# Patient Record
(demographics unavailable — no encounter records)

---

## 2025-01-14 NOTE — ASSESSMENT
[FreeTextEntry1] : 72 years old male with regional node positive locally advanced prostate cancer without evidence of distant metastasis. Patient received bicalutamide for one month and received Eligard 45mg in October 2022. Patient has been enrolled in study NRG--009, Decipher score 0.65, randomized to de-intensification, Arm 1 RT + 24 mos ADT.  - s/p RT with Dr. Araya, 28 frax started on 1/13/2023 and completed on 2/23/2023. - continue follow up with Research team and urology; Decipher score 0.65, patient randomized to de-intensification cohort of NRG--009 Arm 1: RT + 24 mos ADT (ADT started on 10/27/22), last ADT 7/26 - Eligard 45mg given w/ Dr. Rodriguez - f/w urology Dr. Diego; oxybutynin stopped, currently on flomax for urinary dribbling after RT - reviewed labs July 2024: testosterone <2.5, PSA < 0.01 - f/u with PCP for adjusting diet based on HbA1C (HbA1C 6.3 on 1/20/2023). - DEXA scan 10/2022 shows normal bone density; continue calcium and vitamin D - continue with xanax 0.25mg q12h PRN for anxiety - right finger stiffness likely arthritis, continue to monitor - mild anemia/thrombocytopenia: send lab work evaluation  RTC 5-6 months in january

## 2025-01-14 NOTE — HISTORY OF PRESENT ILLNESS
[Disease: _____________________] : Disease: [unfilled] [T: ___] : T[unfilled] [N: ___] : N[unfilled] [M: ___] : M[unfilled] [AJCC Stage: ____] : AJCC Stage: [unfilled] [de-identified] : Klever Mitchell is a 72 years old male with locally advanced prostate cancer who present for following up.   He had the initial consultation on 10/17/22:  initially elevated PSA of 5 in Oct 2020 with "indeterminate" biopsy in [facility].   Patient has been enrolled in study NRG--009, Decipher score 0.65, randomized to de-intensification, Arm 1 RT + 24 mos ADT. Patient was re-consented as there had been changes to protocol, none of which affect his current treatment. Some of the changes in amendment 2 include: Relugolix was added as an acceptable ADT. The imaging sub study that was temporarily closed with protocol amendment 1 has been permanently closed, the applicable sections in the protocol and consent documents have been removed. New exploratory end point and corresponding information were added about impact of PET use. Pre treatment assessments table - tissue, blood and urine: prior to RT time point was added because it was inadvertently omitted. - Footnote 1: Malignancy is considered 0 for ACE Scoring was added for clarity.  6/14/22 PSA 13.2ng/ml  6/17/22 seen by Dr. Diego. MRI prostate showed one lesion measuring 13 mm in the left posterior medial mid gland to apex peripheral is peripheral zone which did not have any evidence of extraprostatic extension seminal vesicle invasion or pelvic lymphadenopathy.  7/25/22 He underwent biopsy. 3 out of 13 cores were positive including the targeted cord which had [place] 4+5 disease in 60% of the core. 2 other cores were 4+3 and 3+4.  8/26/22 whole body bone scan revealed right posterior rib increased uptake concerning for osseous metastasis. Punctate foci in the right anterolateral mid ribs and possibly left mid posterior rib and nonspecific.  9/16/22 PET CT PSMA revealed a small PSMA positive left obturator lymph node which is compatible with metastatic disease. Intense focus of increased radiotracer activity in the left posterior prostate gland consistent with the MRI identified lesion. Likely traumatic rib foci corresponding to the abnormality seen on the bone scan dated 8 /26/22. An indeterminate focus of mild radiotracer activity at the lateral aspect of his right scapula not well delineated on CT. Metastasis unlikely.   10/13/22 PSA 14.6ng/ml  Reports intermittent left big toe pain 5/10 after injury in 2018 and left hip/groin intermittent pain 7/10, relieved with meloxicam and completed 3 weeks ago. Deep venous doppler negative for DVT. He reports difficulty urination, hesitancy, urgency, nocturia x3 and urinary frequency. Denies, dribbling and gross hematuria. He was started on bicalutamide on Oct 13, 2022.   12/9/22: Patient began treatment with eligard on 10/27 with Dr. Araya. He took bicalutamide x 1 month which is now complete. Patient feels well. He denies hot flashes/sweating, mood swings and fatigue. He has nocturia x3, urgency and sometimes feels like he has incomplete emptying; he denies dysuria and hematuria. He exercises most days, normally walks several miles but now that it is getting colder he exercises in the house. He is constipated, has normal bowel movements 3 times a week. Patient denies fever, chills, headache, vision changes, chest pain, palpitations, shortness of breath, abdominal pain, nausea, vomiting, diarrhea, rash, itching, joint pain.   1/120/23: Overall patient is feeling well. He has started RT and is tolerating. He does feel more fatigued than prior, however it is not having a large impact on his life/quality of life. Patient reports in frequent hot flashes. He notes constipation and has a bowel movement about two times per week. He has anxiety related to his diagnosis that he occasionally takes xanax for, usually only once or twice a week. He has urinary frequency, urinates 4-5x during the day and 4-5x during the night, also experiences urgency and incomplete emptying. He reports sometimes when he starts urinating there is a slight burning. He was just prescribed tamsulosin two days ago.     [de-identified] : prostate adenocarcinoma  [de-identified] :  4/28/23 pt and his daughter report that pt has intermittent dizziness unrelated to body position, which started after radiation therapy (finished in 2/2023). He had two episodes of vertigo in the last month but denies fall and hearing difficulty/tinnitus. Admits hot flushes 2 times/daily lasting 2-5 mins. Denied vision changes, headache, N/V, significant changes with urination and Bowels.    7/14/23 He admits intermittent dizziness at [facility] likely due to hot weather. Last dizziness and hot flushes happened around 2 wks ago at [facility] (returned back to [place] on 7/12/23) Complains sleep 5-6hrs at night and sleep has been interrupted by nocturnal urination 3-4/night;  denied urine frequency, difficulty and dripping. Seen by urology Dr. Diego and prescribed oxybutynin 5mg qhs per note.   10/6/23  He came back from [facility] about 3 days ago, denied n/v, urine frequency, difficulty and dripping. He endorsed intermittent dizziness likely due to hot weather at [facility], nocturnal urination 2-3/night. Reports intermittent hot flushes. occasional finger spasms started about 2 months ago but denied numbness and tingling sensation at finger tips. he had two episodes of thrusting pain at scalp at night in the last week, which resolved spontaneous; denied focal weakness, hearing/vision changes, altered mental status.   1/4/24 Hgb 12.2, PSA 0.25ng/ml, LFT wnl, creatinine 0.98 1/22/24 His dizzinees was resolved. Feels overall fine. Reports normal urination, denies frequency and gross hematuria.  Nocturia 2 to 3.   8/9/24 pt is doing well, denies any complaints. Appetite is good, gaining weight.  Denies any diarrhea, constipation or blood in the stool. Now endorses right metacarpal tenderness during flexion [Phalen's test is Negative].

## 2025-01-14 NOTE — REVIEW OF SYSTEMS
[Dysuria] : no dysuria [Diarrhea: Grade 0] : Diarrhea: Grade 0 [Incontinence] : no incontinence [Negative] : Allergic/Immunologic [FreeTextEntry8] : nocturnal urination 3-4/night which interferes with sleep

## 2025-01-16 NOTE — HISTORY OF PRESENT ILLNESS
[Disease: _____________________] : Disease: [unfilled] [T: ___] : T[unfilled] [N: ___] : N[unfilled] [M: ___] : M[unfilled] [AJCC Stage: ____] : AJCC Stage: [unfilled] [de-identified] : Klever Mitchell is a 72 years old male with locally advanced prostate cancer who present for following up.   He had the initial consultation on 10/17/22:  initially elevated PSA of 5 in Oct 2020 with "indeterminate" biopsy in [facility].   Patient has been enrolled in study NRG--009, Decipher score 0.65, randomized to de-intensification, Arm 1 RT + 24 mos ADT. Patient was re-consented as there had been changes to protocol, none of which affect his current treatment. Some of the changes in amendment 2 include: Relugolix was added as an acceptable ADT. The imaging sub study that was temporarily closed with protocol amendment 1 has been permanently closed, the applicable sections in the protocol and consent documents have been removed. New exploratory end point and corresponding information were added about impact of PET use. Pre treatment assessments table - tissue, blood and urine: prior to RT time point was added because it was inadvertently omitted. - Footnote 1: Malignancy is considered 0 for ACE Scoring was added for clarity.  6/14/22 PSA 13.2ng/ml  6/17/22 seen by Dr. Diego. MRI prostate showed one lesion measuring 13 mm in the left posterior medial mid gland to apex peripheral is peripheral zone which did not have any evidence of extraprostatic extension seminal vesicle invasion or pelvic lymphadenopathy.  7/25/22 He underwent biopsy. 3 out of 13 cores were positive including the targeted cord which had [place] 4+5 disease in 60% of the core. 2 other cores were 4+3 and 3+4.  8/26/22 whole body bone scan revealed right posterior rib increased uptake concerning for osseous metastasis. Punctate foci in the right anterolateral mid ribs and possibly left mid posterior rib and nonspecific.  9/16/22 PET CT PSMA revealed a small PSMA positive left obturator lymph node which is compatible with metastatic disease. Intense focus of increased radiotracer activity in the left posterior prostate gland consistent with the MRI identified lesion. Likely traumatic rib foci corresponding to the abnormality seen on the bone scan dated 8 /26/22. An indeterminate focus of mild radiotracer activity at the lateral aspect of his right scapula not well delineated on CT. Metastasis unlikely.   10/13/22 PSA 14.6ng/ml  Reports intermittent left big toe pain 5/10 after injury in 2018 and left hip/groin intermittent pain 7/10, relieved with meloxicam and completed 3 weeks ago. Deep venous doppler negative for DVT. He reports difficulty urination, hesitancy, urgency, nocturia x3 and urinary frequency. Denies, dribbling and gross hematuria. He was started on bicalutamide on Oct 13, 2022.   12/9/22: Patient began treatment with eligard on 10/27 with Dr. Araya. He took bicalutamide x 1 month which is now complete. Patient feels well. He denies hot flashes/sweating, mood swings and fatigue. He has nocturia x3, urgency and sometimes feels like he has incomplete emptying; he denies dysuria and hematuria. He exercises most days, normally walks several miles but now that it is getting colder he exercises in the house. He is constipated, has normal bowel movements 3 times a week. Patient denies fever, chills, headache, vision changes, chest pain, palpitations, shortness of breath, abdominal pain, nausea, vomiting, diarrhea, rash, itching, joint pain.   1/120/23: Overall patient is feeling well. He has started RT and is tolerating. He does feel more fatigued than prior, however it is not having a large impact on his life/quality of life. Patient reports in frequent hot flashes. He notes constipation and has a bowel movement about two times per week. He has anxiety related to his diagnosis that he occasionally takes xanax for, usually only once or twice a week. He has urinary frequency, urinates 4-5x during the day and 4-5x during the night, also experiences urgency and incomplete emptying. He reports sometimes when he starts urinating there is a slight burning. He was just prescribed tamsulosin two days ago.     [de-identified] : prostate adenocarcinoma  [FreeTextEntry1] : NRG--009--->will stop trial--->ADT [de-identified] :  4/28/23 pt and his daughter report that pt has intermittent dizziness unrelated to body position, which started after radiation therapy (finished in 2/2023). He had two episodes of vertigo in the last month but denies fall and hearing difficulty/tinnitus. Admits hot flushes 2 times/daily lasting 2-5 mins. Denied vision changes, headache, N/V, significant changes with urination and Bowels.    7/14/23 He admits intermittent dizziness at [facility] likely due to hot weather. Last dizziness and hot flushes happened around 2 wks ago at [facility] ([place] back to [place] on 7[place]12/23) Complains sleep 5-6hrs at night and sleep has been interrupted by nocturnal urination 3-4/night;  denied urine frequency, difficulty and dripping. Seen by urology Dr. Diego and prescribed oxybutynin 5mg qhs per note.   10/6/23  He came back from [facility] about 3 days ago, denied n/v, urine frequency, difficulty and dripping. He endorsed intermittent dizziness likely due to hot weather at [facility], nocturnal urination 2-3/night. Reports intermittent hot flushes. occasional finger spasms started about 2 months ago but denied numbness and tingling sensation at finger tips. he had two episodes of thrusting pain at scalp at night in the last week, which resolved spontaneous; denied focal weakness, hearing/vision changes, altered mental status.   1/4/24 Hgb 12.2, PSA 0.25ng/ml, LFT wnl, creatinine 0.98 1/22/24 His dizzinees was resolved. Feels overall fine. Reports normal urination, denies frequency and gross hematuria.  Nocturia 2 to 3.   8/9/24 pt is doing well, denies any complaints. Appetite is good, gaining weight.  Denies any diarrhea, constipation or blood in the stool. Now endorses right metacarpal tenderness during flexion [Phalen's test is Negative].  1/15/25: Patient presents for follow up. He remains on the NRG--009 trial but he was in [facility] and non-compliant with office visits. Last visit was August, 2024 and the protocol requires office visits every 3 months. He experiences dizziness every so often. He endorses frequent urination every hour but sometimes does not urinate. He drinks 7-8 bottles per day.  At night he urinates 4 times a night. Denies night sweats, hot flashes.

## 2025-01-16 NOTE — ASSESSMENT
[FreeTextEntry1] : 72 years old male with regional node positive locally advanced prostate cancer without evidence of distant metastasis. Patient received bicalutamide for one month and received Eligard 45mg in October 2022. Patient has been enrolled in study NRG--009, Decipher score 0.65, randomized to de-intensification, Arm 1 RT + 24 mos ADT. Now, off the trial due to non-compliance. He is on surveillance every 6 months, since it has been 2 years  since he was on ADT with PSA undetectable.  - s/p RT with Dr. Araya, 28 frax started on 1/13/2023 and completed on 2/23/2023. - continue follow up with Research team and urology; Decipher score 0.65, patient randomized to de-intensification cohort of NRG--009 Arm 1: RT + 24 mos ADT (ADT started on 10/27/22), last ADT 7/26 - Eligard 45mg given w/ Dr. Rodriguez, has been 2 years no longer receives this - f/w urology Dr. Diego; oxybutynin stopped, currently on flomax for urinary dribbling after RT - reviewed labs July 2024: testosterone <2.5, PSA < 0.01 - f/u with PCP for adjusting diet based on HbA1C (HbA1C 6.3 on 1/20/2023). - DEXA scan 10/2022 shows normal bone density; continue calcium and vitamin D - continue with xanax 0.25mg q12h PRN for anxiety - mild anemia/thrombocytopenia: send lab work evaluation - on surveillance now every 6 months with PSA  RTC 5-6 months in june  Patient seen and d/w Dr. Mary Cifuentes MD PGY5 Hematology/Oncology Pager: [phone] I have participated in history collection, physical exam and making assessment/plan through the whole clinic visit. I also reviewed and edited the note.

## 2025-01-16 NOTE — HISTORY OF PRESENT ILLNESS
[Disease: _____________________] : Disease: [unfilled] [T: ___] : T[unfilled] [N: ___] : N[unfilled] [M: ___] : M[unfilled] [AJCC Stage: ____] : AJCC Stage: [unfilled] [de-identified] : Klever Mitchell is a 72 years old male with locally advanced prostate cancer who present for following up.   He had the initial consultation on 10/17/22:  initially elevated PSA of 5 in Oct 2020 with "indeterminate" biopsy in [facility].   Patient has been enrolled in study NRG--009, Decipher score 0.65, randomized to de-intensification, Arm 1 RT + 24 mos ADT. Patient was re-consented as there had been changes to protocol, none of which affect his current treatment. Some of the changes in amendment 2 include: Relugolix was added as an acceptable ADT. The imaging sub study that was temporarily closed with protocol amendment 1 has been permanently closed, the applicable sections in the protocol and consent documents have been removed. New exploratory end point and corresponding information were added about impact of PET use. Pre treatment assessments table - tissue, blood and urine: prior to RT time point was added because it was inadvertently omitted. - Footnote 1: Malignancy is considered 0 for ACE Scoring was added for clarity.  6/14/22 PSA 13.2ng/ml  6/17/22 seen by Dr. Diego. MRI prostate showed one lesion measuring 13 mm in the left posterior medial mid gland to apex peripheral is peripheral zone which did not have any evidence of extraprostatic extension seminal vesicle invasion or pelvic lymphadenopathy.  7/25/22 He underwent biopsy. 3 out of 13 cores were positive including the targeted cord which had [place] 4+5 disease in 60% of the core. 2 other cores were 4+3 and 3+4.  8/26/22 whole body bone scan revealed right posterior rib increased uptake concerning for osseous metastasis. Punctate foci in the right anterolateral mid ribs and possibly left mid posterior rib and nonspecific.  9/16/22 PET CT PSMA revealed a small PSMA positive left obturator lymph node which is compatible with metastatic disease. Intense focus of increased radiotracer activity in the left posterior prostate gland consistent with the MRI identified lesion. Likely traumatic rib foci corresponding to the abnormality seen on the bone scan dated 8 /26/22. An indeterminate focus of mild radiotracer activity at the lateral aspect of his right scapula not well delineated on CT. Metastasis unlikely.   10/13/22 PSA 14.6ng/ml  Reports intermittent left big toe pain 5/10 after injury in 2018 and left hip/groin intermittent pain 7/10, relieved with meloxicam and completed 3 weeks ago. Deep venous doppler negative for DVT. He reports difficulty urination, hesitancy, urgency, nocturia x3 and urinary frequency. Denies, dribbling and gross hematuria. He was started on bicalutamide on Oct 13, 2022.   12/9/22: Patient began treatment with eligard on 10/27 with Dr. Araya. He took bicalutamide x 1 month which is now complete. Patient feels well. He denies hot flashes/sweating, mood swings and fatigue. He has nocturia x3, urgency and sometimes feels like he has incomplete emptying; he denies dysuria and hematuria. He exercises most days, normally walks several miles but now that it is getting colder he exercises in the house. He is constipated, has normal bowel movements 3 times a week. Patient denies fever, chills, headache, vision changes, chest pain, palpitations, shortness of breath, abdominal pain, nausea, vomiting, diarrhea, rash, itching, joint pain.   1/120/23: Overall patient is feeling well. He has started RT and is tolerating. He does feel more fatigued than prior, however it is not having a large impact on his life/quality of life. Patient reports in frequent hot flashes. He notes constipation and has a bowel movement about two times per week. He has anxiety related to his diagnosis that he occasionally takes xanax for, usually only once or twice a week. He has urinary frequency, urinates 4-5x during the day and 4-5x during the night, also experiences urgency and incomplete emptying. He reports sometimes when he starts urinating there is a slight burning. He was just prescribed tamsulosin two days ago.     [de-identified] : prostate adenocarcinoma  [FreeTextEntry1] : NRG--009--->will stop trial--->ADT [de-identified] :  4/28/23 pt and his daughter report that pt has intermittent dizziness unrelated to body position, which started after radiation therapy (finished in 2/2023). He had two episodes of vertigo in the last month but denies fall and hearing difficulty/tinnitus. Admits hot flushes 2 times/daily lasting 2-5 mins. Denied vision changes, headache, N/V, significant changes with urination and Bowels.    7/14/23 He admits intermittent dizziness at [facility] likely due to hot weather. Last dizziness and hot flushes happened around 2 wks ago at [facility] (return[place] back to [place] on 7[place]12/23) Complains sleep 5-6hrs at night and sleep has been interrupted by nocturnal urination 3-4/night;  denied urine frequency, difficulty and dripping. Seen by urology Dr. Diego and prescribed oxybutynin 5mg qhs per note.   10/6/23  He came back from [facility] about 3 days ago, denied n/v, urine frequency, difficulty and dripping. He endorsed intermittent dizziness likely due to hot weather at [facility], nocturnal urination 2-3/night. Reports intermittent hot flushes. occasional finger spasms started about 2 months ago but denied numbness and tingling sensation at finger tips. he had two episodes of thrusting pain at scalp at night in the last week, which resolved spontaneous; denied focal weakness, hearing/vision changes, altered mental status.   1/4/24 Hgb 12.2, PSA 0.25ng/ml, LFT wnl, creatinine 0.98 1/22/24 His dizzinees was resolved. Feels overall fine. Reports normal urination, denies frequency and gross hematuria.  Nocturia 2 to 3.   8/9/24 pt is doing well, denies any complaints. Appetite is good, gaining weight.  Denies any diarrhea, constipation or blood in the stool. Now endorses right metacarpal tenderness during flexion [Phalen's test is Negative].  1/15/25: Patient presents for follow up. He remains on the NRG--009 trial but he was in [facility] and non-compliant with office visits. Last visit was August, 2024 and the protocol requires office visits every 3 months. He experiences dizziness every so often. He endorses frequent urination every hour but sometimes does not urinate. He drinks 7-8 bottles per day.  At night he urinates 4 times a night. Denies night sweats, hot flashes.

## 2025-01-16 NOTE — REVIEW OF SYSTEMS
[Diarrhea: Grade 0] : Diarrhea: Grade 0 [Negative] : Allergic/Immunologic [Dysuria] : no dysuria [Incontinence] : no incontinence [FreeTextEntry8] : nocturnal urination 3-4/night which interferes with sleep, urge incontinence

## 2025-01-16 NOTE — HISTORY OF PRESENT ILLNESS
[Disease: _____________________] : Disease: [unfilled] [T: ___] : T[unfilled] [N: ___] : N[unfilled] [M: ___] : M[unfilled] [AJCC Stage: ____] : AJCC Stage: [unfilled] [de-identified] : Klever Mitchell is a 72 years old male with locally advanced prostate cancer who present for following up.   He had the initial consultation on 10/17/22:  initially elevated PSA of 5 in Oct 2020 with "indeterminate" biopsy in [facility].   Patient has been enrolled in study NRG--009, Decipher score 0.65, randomized to de-intensification, Arm 1 RT + 24 mos ADT. Patient was re-consented as there had been changes to protocol, none of which affect his current treatment. Some of the changes in amendment 2 include: Relugolix was added as an acceptable ADT. The imaging sub study that was temporarily closed with protocol amendment 1 has been permanently closed, the applicable sections in the protocol and consent documents have been removed. New exploratory end point and corresponding information were added about impact of PET use. Pre treatment assessments table - tissue, blood and urine: prior to RT time point was added because it was inadvertently omitted. - Footnote 1: Malignancy is considered 0 for ACE Scoring was added for clarity.  6/14/22 PSA 13.2ng/ml  6/17/22 seen by Dr. Diego. MRI prostate showed one lesion measuring 13 mm in the left posterior medial mid gland to apex peripheral is peripheral zone which did not have any evidence of extraprostatic extension seminal vesicle invasion or pelvic lymphadenopathy.  7/25/22 He underwent biopsy. 3 out of 13 cores were positive including the targeted cord which had [place] 4+5 disease in 60% of the core. 2 other cores were 4+3 and 3+4.  8/26/22 whole body bone scan revealed right posterior rib increased uptake concerning for osseous metastasis. Punctate foci in the right anterolateral mid ribs and possibly left mid posterior rib and nonspecific.  9/16/22 PET CT PSMA revealed a small PSMA positive left obturator lymph node which is compatible with metastatic disease. Intense focus of increased radiotracer activity in the left posterior prostate gland consistent with the MRI identified lesion. Likely traumatic rib foci corresponding to the abnormality seen on the bone scan dated 8 /26/22. An indeterminate focus of mild radiotracer activity at the lateral aspect of his right scapula not well delineated on CT. Metastasis unlikely.   10/13/22 PSA 14.6ng/ml  Reports intermittent left big toe pain 5/10 after injury in 2018 and left hip/groin intermittent pain 7/10, relieved with meloxicam and completed 3 weeks ago. Deep venous doppler negative for DVT. He reports difficulty urination, hesitancy, urgency, nocturia x3 and urinary frequency. Denies, dribbling and gross hematuria. He was started on bicalutamide on Oct 13, 2022.   12/9/22: Patient began treatment with eligard on 10/27 with Dr. Araya. He took bicalutamide x 1 month which is now complete. Patient feels well. He denies hot flashes/sweating, mood swings and fatigue. He has nocturia x3, urgency and sometimes feels like he has incomplete emptying; he denies dysuria and hematuria. He exercises most days, normally walks several miles but now that it is getting colder he exercises in the house. He is constipated, has normal bowel movements 3 times a week. Patient denies fever, chills, headache, vision changes, chest pain, palpitations, shortness of breath, abdominal pain, nausea, vomiting, diarrhea, rash, itching, joint pain.   1/120/23: Overall patient is feeling well. He has started RT and is tolerating. He does feel more fatigued than prior, however it is not having a large impact on his life/quality of life. Patient reports in frequent hot flashes. He notes constipation and has a bowel movement about two times per week. He has anxiety related to his diagnosis that he occasionally takes xanax for, usually only once or twice a week. He has urinary frequency, urinates 4-5x during the day and 4-5x during the night, also experiences urgency and incomplete emptying. He reports sometimes when he starts urinating there is a slight burning. He was just prescribed tamsulosin two days ago.     [de-identified] : prostate adenocarcinoma  [FreeTextEntry1] : NRG--009--->will stop trial--->ADT [de-identified] :  4/28/23 pt and his daughter report that pt has intermittent dizziness unrelated to body position, which started after radiation therapy (finished in 2/2023). He had two episodes of vertigo in the last month but denies fall and hearing difficulty/tinnitus. Admits hot flushes 2 times/daily lasting 2-5 mins. Denied vision changes, headache, N/V, significant changes with urination and Bowels.    7/14/23 He admits intermittent dizziness at [facility] likely due to hot weather. Last dizziness and hot flushes happened around 2 wks ago at [facility] ([place] back [place] on 7/12/23) Complains sleep 5-6hrs at night and sleep has been interrupted by nocturnal urination 3-4/night;  denied urine frequency, difficulty and dripping. Seen by urology Dr. Diego and prescribed oxybutynin 5mg qhs per note.   10/6/23  He came back from [facility] about 3 days ago, denied n/v, urine frequency, difficulty and dripping. He endorsed intermittent dizziness likely due to hot weather at [facility], nocturnal urination 2-3/night. Reports intermittent hot flushes. occasional finger spasms started about 2 months ago but denied numbness and tingling sensation at finger tips. he had two episodes of thrusting pain at scalp at night in the last week, which resolved spontaneous; denied focal weakness, hearing/vision changes, altered mental status.   1/4/24 Hgb 12.2, PSA 0.25ng/ml, LFT wnl, creatinine 0.98 1/22/24 His dizzinees was resolved. Feels overall fine. Reports normal urination, denies frequency and gross hematuria.  Nocturia 2 to 3.   8/9/24 pt is doing well, denies any complaints. Appetite is good, gaining weight.  Denies any diarrhea, constipation or blood in the stool. Now endorses right metacarpal tenderness during flexion [Phalen's test is Negative].  1/15/25: Patient presents for follow up. He remains on the NRG--009 trial but he was in [facility] and non-compliant with office visits. Last visit was August, 2024 and the protocol requires office visits every 3 months. He experiences dizziness every so often. He endorses frequent urination every hour but sometimes does not urinate. He drinks 7-8 bottles per day.  At night he urinates 4 times a night. Denies night sweats, hot flashes.

## 2025-01-16 NOTE — ASSESSMENT
[FreeTextEntry1] : 72 years old male with regional node positive locally advanced prostate cancer without evidence of distant metastasis. Patient received bicalutamide for one month and received Eligard 45mg in October 2022. Patient has been enrolled in study NRG--009, Decipher score 0.65, randomized to de-intensification, Arm 1 RT + 24 mos ADT. Now, off the trial due to non-compliance. He is on surveillance every 6 months, since it has been 2 years  since he was on ADT with PSA undetectable.  - s/p RT with Dr. Araya, 28 frax started on 1/13/2023 and completed on 2/23/2023. - continue follow up with Research team and urology; Decipher score 0.65, patient randomized to de-intensification cohort of NRG--009 Arm 1: RT + 24 mos ADT (ADT started on 10/27/22), last ADT 7/26 - Eligard 45mg given w/ Dr. Rodriguez, has been 2 years no longer receives this - f/w urology Dr. Diego; oxybutynin stopped, currently on flomax for urinary dribbling after RT - reviewed labs July 2024: testosterone <2.5, PSA < 0.01 - f/u with PCP for adjusting diet based on HbA1C (HbA1C 6.3 on 1/20/2023). - DEXA scan 10/2022 shows normal bone density; continue calcium and vitamin D - continue with xanax 0.25mg q12h PRN for anxiety - mild anemia/thrombocytopenia: send lab work evaluation - on surveillance now every 6 months with PSA  RTC 5-6 months in june  Patient seen and d/w Dr. Mary Cifuentes MD PGY5 Hematology/Oncology Pager: [phone] I have participated in history collection, physical exam and making assessment/plan through the whole clinic visit. I also reviewed and edited the note.

## 2025-01-16 NOTE — ASSESSMENT
[FreeTextEntry1] : 72 years old male with regional node positive locally advanced prostate cancer without evidence of distant metastasis. Patient received bicalutamide for one month and received Eligard 45mg in October 2022. Patient has been enrolled in study NRG--009, Decipher score 0.65, randomized to de-intensification, Arm 1 RT + 24 mos ADT. Now, off the trial due to non-compliance. He is on surveillance every 6 months, since it has been 2 years  since he was on ADT with PSA undetectable.  - s/p RT with Dr. Araya, 28 frax started on 1/13/2023 and completed on 2/23/2023. - continue follow up with Research team and urology; Decipher score 0.65, patient randomized to de-intensification cohort of NRG--009 Arm 1: RT + 24 mos ADT (ADT started on 10/27/22), last ADT 7/26 - Eligard 45mg given w/ Dr. Rodriguez, has been 2 years no longer receives this - f/w urology Dr. Diego; oxybutynin stopped, currently on flomax for urinary dribbling after RT - reviewed labs July 2024: testosterone <2.5, PSA < 0.01 - f/u with PCP for adjusting diet based on HbA1C (HbA1C 6.3 on 1/20/2023). - DEXA scan 10/2022 shows normal bone density; continue calcium and vitamin D - continue with xanax 0.25mg q12h PRN for anxiety - mild anemia/thrombocytopenia: send lab work evaluation - on surveillance now every 6 months with PSA  RTC 5-6 months in june  Patient seen and d/w Dr. Mary Cifuentes MD PGY5 Hematology/Oncology Pager: [phone] I have participated in history collection, physical exam and making assessment/plan through the whole clinic visit. I also reviewed and edited the note.

## 2025-01-17 NOTE — REVIEW OF SYSTEMS
[Constipation: Grade 0] : Constipation: Grade 0 [Hematuria: Grade 0] : Hematuria: Grade 0 [Urinary Incontinence: Grade 0] : Urinary Incontinence: Grade 0  [Urinary Retention: Grade 1 - Urinary, suprapubic or intermittent catheter placement not indicated; able to void with some residual] : Urinary Retention: Grade 1 - Urinary, suprapubic or intermittent catheter placement not indicated; able to void with some residual [Urinary Tract Pain: Grade 0] : Urinary Tract Pain: Grade 0 [Urinary Urgency: Grade 1 - Present] : Urinary Urgency: Grade 1 - Present [Urinary Frequency: Grade 1 - Present] : Urinary Frequency: Grade 1 - Present

## 2025-01-21 NOTE — DISEASE MANAGEMENT
[c] : c [1] : N1 [0] : M0 [10-20] : 10 - 20 ng/mL [Biopsy with Fusion] : Patient had a biopsy with fusion on [7(4+3)] : Template Biopsy [place] Score: 7(4+3) [9] : Fusion Biopsy Kris Score: 9 [] : Patient had a Prostate MRI [4] : 4 [Clinical] : TNM Stage: c [ANA] : ANA [Radiation Therapy] : Radiation Therapy [Treatment with radiation therapy] : Treatment with radiation therapy [EBRT] : EBRT [Treatment with androgen ablation] : Treatment with androgen ablation [BiopsyDate] : 7/25/22 [MeasuredProstateVolume] : 43 [TotalCores] : 13 [TotalPositiveCores] : 3 [MaxCoreInvolvement] : 60% [FreeTextEntry7] : MR prostate 6/17/22 showed a 4.3 x 3.8 x 5.0 cm = 43 cc gland with dominant lesion in left posteromedial mid to apex peripheral zone (1.3 cm, [place]-4); no DEVIN/SVI/LAD. \par  \par  Biopsy performed 7/25/22 showed G9(4+5) disease in the target lesion (2/4, %); G7(4+3) disease in the left posterior medial apex (1/1, 25%), G7(3+4) disease in the left posterior lateral apex (1/1, 40%).  \par  \par  PSMA PET 9/16/22 showed avidity in left prostate as well as in a left obturator node c/w metastasis. (node only concerning on PSMA imaging) [TTNM] : 1c [NTNM] : 1 [MTNM] : 0 [LDR] : No LDR [HDR] : No HDR [RadiationCompletedDate] : 2/23/23 [EBRTDose] : 4524 [EBRTFractions] : 28 [ADTStartedDate] : 10/27/22 [ADTDuration] : 2 years

## 2025-01-21 NOTE — HISTORY OF PRESENT ILLNESS
[FreeTextEntry1] : 72-year-old male with very high risk prostate cancer, cT1c N0-1 (N1 only on PSMA PET) G9(4+5), with pre-diagnosis PSA 13.2 6/16/22; pre-Tx PSA 14.6 on 10/13/22. Decipher 0.65. Baseline IPSS/EPIC Score 15/29.   He enrolled on the -009 protocol, and was randomized to Arm 1, definitive radiation therapy and 2 years ADT.  ADT was started 10/27/22.  The patient underwent a course of radiation therapy as outlined below.   Radiation Treatment Summary:  Prostate/SV/Nodes- 7,000 cGy in 28 fx from 1/13/2023 - 2/23/2023  Clinical Course: Tolerated the treatment well and developed the expected side effects, including increased urgency/frequency and dysuria; this was treated with tamsulosin and AZO.  4/26/23: PTE Notes improvement in  symptoms and almost back to baseline. Nocturia 4x, good stream, intermittent post void dribbling. Hot flashes bothersome but not tried black cohosh. Stops fluids at 6 PM. Denies dysuria, hematuria or bowel symptoms. Taking tamsulosin 1 tab at night. not taking AZO.  Not sexually active without issue.  IPSS/QOL/EPIC Score 14/5/16. Of note due for ADT injection but awaiting insurance approval.  Followup 7/25/2023 Present in follow up visit. He continues to be followed by Research team and Radiation Oncology; he received Eligard 22.5 mg dose, most recently 4/28/23 with us for 3 months dose.  Now will continue with Eligard every 6 months. Eligard 45 mg given today. He also f/w urology Dr. Diego; c/w oxybutynin and Flomax Still has urgency and frequent urination throughout the day. Up frequently at night to urinate. Taking flomax x1, oxybutynin 5mg x1. Notes that when he is very active he sometimes gets lightheaded but is not hydrating well. No bowel issues. Not too bothered by hot flashes. Feels that he doesn't know what to eat and is not eating enough based on what he reads from google search.  IPSS/QOL/EPIC: Score 19/2/19  Followup 10/5/2023: Patient presents for follow up via telehealth.  Nocturia x2-3, not taking oxybutynin, was taking flomax but just ran out. Overall had felt improvement in symptoms with less urgency on medication. Bowels okay, no diarrhea. Still some dizziness but better. No issues with hot flashes.   Follow-up 1/25/2024  Patient report nocturia 2-3 times per night, and frequency. He denies dysuria, hematuria, hesitancy, and weak flow. Patient endorsed occasional constipation and he is not sexually active. No hot flashes, good energy level.  IPSS/QOL/EPIC: 9/1/3  PSA trend: iPSA 14.6; baseline testosterone 347 -> ADT started 10/27/22(planned x 2 years). Final 3 mo Eligard 7/25/24 12/9/22 PSA 2.35 1/20/23 PSA 1.58 -> RT complete 2/23/2023 4/28/23: PSA 0.12; Testosterone 19.2 7/14/23: PSA 0.04; testosterone 4.3 10/4/23:0.02, testosterone 7.5 1/4/24: 0.25 1/22/24: 0.31, testosterone 440  2/18/24:0.33 7/22/24: <0.01  Follow-up 4/3/24:  Feels generally well. Notes stable urinary frequency with occasional dribbling. Continues on tamsulosin IPSS/QOL/EPIC: 9/1/17  1/15/2025 -- PSA <0.01  1/17/2025 F/U. Enrolled on the -009 protocol, and was randomized to Arm 1, definitive radiation therapy and 2 years ADT (started 10/27/22 with final 3 mo. Eligard 7/25/24) Feels generally well and offers no acute complaints. Continues on tamsulosin daily.  Nocturia X3.

## 2025-01-21 NOTE — DISEASE MANAGEMENT
[c] : c [1] : N1 [0] : M0 [10-20] : 10 - 20 ng/mL [Biopsy with Fusion] : Patient had a biopsy with fusion on [7(4+3)] : Template Biopsy [place] Score: 7(4+3) [9] : Fusion Biopsy Kris Score: 9 [] : Patient had a Prostate MRI [4] : 4 [Clinical] : TNM Stage: c [ANA] : ANA [Radiation Therapy] : Radiation Therapy [Treatment with radiation therapy] : Treatment with radiation therapy [EBRT] : EBRT [Treatment with androgen ablation] : Treatment with androgen ablation [BiopsyDate] : 7/25/22 [MeasuredProstateVolume] : 43 [TotalCores] : 13 [TotalPositiveCores] : 3 [MaxCoreInvolvement] : 60% [FreeTextEntry7] : MR prostate 6/17/22 showed a 4.3 x 3.8 x 5.0 cm = 43 cc gland with dominant lesion in left posteromedial mid to apex peripheral zone (1.3 cm, [place]-4); no DEVIN/SVI/LAD. \par  \par  Biopsy performed 7/25/22 showed G9(4+5) disease in the target lesion (2/4, %); G7(4+3) disease in the left posterior medial apex (1/1, 25%), G7(3+4) disease in the left posterior lateral apex (1/1, 40%).  \par  \par  PSMA PET 9/16/22 showed avidity in left prostate as well as in a left obturator node c/w metastasis. (node only concerning on PSMA imaging) [TTNM] : 1c [NTNM] : 1 [MTNM] : 0 [LDR] : No LDR [HDR] : No HDR [RadiationCompletedDate] : 2/23/23 [EBRTDose] : 9474 [EBRTFractions] : 28 [ADTStartedDate] : 10/27/22 [ADTDuration] : 2 years

## 2025-01-23 NOTE — HISTORY OF PRESENT ILLNESS
[Nocturia] : nocturia [None] : None [FreeTextEntry1] : : May 8 1952 Referring Provider: SELF,REFERRED  HPI: Mr. AL IRENE is a 70 year yo M with a PMHx notable for elevated PSA. Here today after his physician in [place] initially had a PSA of 5, then up to 20 underwent a biopsy that was "indeterminate" and repeated the PSA which reportedly was 5. Here today after not getting a PSA in 1 year. No weight loss, or discomfort, no bone pain. He has been off of Hytrin for the last 3 weeks. Having significant erectile difficulty, unclear if interested in meds (daughter here translating).  PVR 0 cc. Flow curve: VV 248cc, Qmax 36.7 cc/s, Qavg 13 cc/s  Anticoagulation: None Meds: amlodipine, hytrin All: NKDA Social: never smoker/drinker; , children;  PMHx: HTN, BPH, HLD, pre-DM, cataracts/glaucoma FHx: Mother with bone cancer, no CAP PSHx: None  Imaging: No imaging  : Here today with pathology with GS4+5=9 CaP. Here today to discuss options. Today we discussed the natural history of localized prostate cancer, and the heterogeneous biology of this malignancy. We discussed the fact that many prostate cancers are slow growing and unlikely to metastasize or cause death, whereas others can be life threatening. We reviewed risk stratification, staging, Kris scoring, and PSA levels as they pertain to risk.  All treatment options were reviewed. This included active surveillance, androgen deprivation, emerging options such as focal therapy/HIFU/cryotherapy, radiation options (including IMRT, SBRT, brachytherapy) and surgical options (open vs. robotic surgery, nerve vs. non-nerve sparing). Oncologic outcomes were compared and contrasted. Risks of biochemical and clinical recurrence discussed. Risks of needing adjuvant or salvage treatments reviewed. We discussed the risks of secondary malignancy after radiation. We discussed the opportunity for pathological staging with surgery vs. other options. We discussed the possibility of positive margins, treatment failure, cancer recurrence and cancer-related death even with treatment.  All potential side effects of treatment were reviewed including, but not limited to: short term or permanent stress urinary incontinence and/or short term or permanent erectile dysfunction, penile shortening, rectal symptoms/pain, perineal pain, and other side effects.  All potential complications of treatment and surgery were reviewed including, but not limited to: risks of conversion from MIS to open surgery discussed, bleeding//life-threatening hemorrhage, rectal injury requiring colostomy or delayed recognition leading to fistula, vascular/bowel/adjacent visceral organ injury, trocar/access injury, the possibility of recognized vs. unrecognized/delayed-recognition injury, risks of thermal/blunt/sharp/retraction injury, risks of DVT, PE, MI, death, risks of cardiopulmonary/anesthesia related complications, positional injury, infection/collection/abscess, wound complications/dehiscence/seroma/cellulitis, urinoma/fistula, ureteral injury/obstruction, bladder neck contracture, penile shortening, meatal stenosis, urethral stricture, lymphocele, obturator nerve injury, prolonged anastomotic leak, and other complications.  : Here today with very high risk prostate cancer, cT1c N0-1 (N1 only on PSMA PET) G9(4+5), with pre-diagnosis PSA 13.2 22. Decipher 0.65. He enrolled on the -009 protocol, and was randomized to definitive radiation therapy and 2 years ADT. ADT was started 10/27/22. Here today for follow up given his urinary frequency. Nocturia 4x, good stream, intermittent post void dribbling. Hot flashes bothersome but not tried black cohosh. Stops fluids at 6 PM. Denies dysuria, hematuria or bowel symptoms. Taking tamsulosin 1 tab at night. not taking AZO. PVR today 6 cc. Doing well otherwise.  2524 71M with very high risk prostate cancer, cT1c N0-1 (N1 only on PSMA PET) G9(4+5), with pre-Tx PSA 14.6 on 10/13/22. Decipher 0.65. He enrolled on the -009 protocol, and was randomized to Arm 1. definitive radiation therapy and 2 years ADT. ADT was started 10/27/22, and RT to prostate, seminal vesicles, and pelvic nodes was completed 2023. Pt here for f/u for urinary frequency- did not take Oxybutynin but feels improvement about 70%- bothered by nocturia (2-3x)- dependent on fluid intake. PSA = .31 (2024) from 0.02 (10/2023)- has appt with Dr. Araya today- may need PSMA. If recurrent in prostate- may need salvage proctectomy. PVR = 2cc. Improved urinary symptoms. We will refill his tamsulosin; continue oxybutynin and reviewed appropriate use. Testosterone not fully suppressed, PSA is up a little; confirmed that he had received a 6 month dose in 2023. Will re-dose today. We will arrange followup and repeat labs per protocol in April. T level in 2024 - noted to be 440, getting new Eligard with Dr. Araya. Will recheck PSA once the shot has been administered.  24: Patient presents for follow up. PSA 0.33 in February. He saw Dr. Araya in April who recommended PSA, but does not appear to be drawn. He did try oxybutynin for his urinary frequency but no longer takes this and is only taking flomax. He is not bothered by his urinary symptoms, but does have nocturia x2. Notes one episode of red blood in his stool yesterday. Reports no nausea, vomiting, or significant weight loss. He last had colonoscopy two years ago and will make appointment with GI to follow up.   25 Pt presents today for urinary frequency and nocturia- reports doing well until yesterday when nocturia was q1hr about 7-8x- not like this prior. Pt with hx of radiation for prostate cancer. Interested in trying Solifenacin 5mg- discussed side effects of dry mouth and constipation- aware can stop if side effects are intolerable. PVR - 0cc. PSA = 0.01ng/mL done 2025. Pt to continue with Flomax.  No dysuria or hematuria.  [Urinary Incontinence] : no urinary incontinence [Urinary Retention] : no urinary retention [Urinary Urgency] : no urinary urgency [Urinary Frequency] : no urinary frequency [Straining] : no straining [Weak Stream] : no weak stream [Intermittency] : no intermittency [Post-Void Dribbling] : no post-void dribbling [Dysuria] : no dysuria [Hematuria - Gross] : no gross hematuria

## 2025-01-23 NOTE — ASSESSMENT
[FreeTextEntry1] : 72Mhere for Urinary Frequency and Nocturia   #Urinary Frequency and Nocturia - Bothered by current symptoms of increased nocturia - will do trial of Solifenecin 5mg  - Continue Flomax, emptying well, PVR = 0cc.  #CaP - PSA undetectable s/p XRT - PSA 6 months with Dr. Parada  when back from [facility]  RTO in 6 months with Dr. Sandeep Parada

## 2025-06-09 NOTE — PHYSICAL EXAM
[Alert] : alert [Healthy Appearing] : healthy appearing [Sclera] : the sclera and conjunctiva were normal [Normal Appearance] : the appearance of the neck was normal [Auscultation Breath Sounds / Voice Sounds] : lungs were clear to auscultation bilaterally [No Respiratory Distress] : no respiratory distress [Heart Rate And Rhythm] : heart rate was normal and rhythm regular [Normal S1, S2] : normal S1 and S2 [Bowel Sounds] : normal bowel sounds [No CVA Tenderness] : no CVA  tenderness [Abdomen Soft] : soft [Abnormal Walk] : normal gait [] : no rash [Oriented To Time, Place, And Person] : oriented to person, place, and time [No Focal Deficits] : no focal deficits

## 2025-06-09 NOTE — REVIEW OF SYSTEMS
[As Noted in HPI] : as noted in HPI [Fever] : no fever [Chills] : no chills [Sore Throat] : no sore throat [Red Eyes] : eyes not red [Chest Pain] : no chest pain [SOB on Exertion] : no shortness of breath during exertion [Rash] : no rash [Joint Stiffness] : no joint stiffness [Skin Wound] : no skin wound [Dizziness] : no dizziness [Anxiety] : no anxiety [Muscle Weakness] : no muscle weakness [Easy Bruising] : no tendency for easy bruising

## 2025-06-09 NOTE — HISTORY OF PRESENT ILLNESS
[FreeTextEntry1] : 74 yo male with prostate ca, s/p radiation treatment, 2 years ago. s/p colonoscopy in 2022 and showed colon polyp,tubular adenoma.  Patient reports constipation, patient reports 3 times a week. no brbpr, no melena. no weight loss. no n/v. no gerd.  Daughter translating on phone, pt request.  no family h/o gastric or colon cancer

## 2025-06-11 NOTE — REVIEW OF SYSTEMS
[Hematuria: Grade 0] : Hematuria: Grade 0 [Urinary Incontinence: Grade 0] : Urinary Incontinence: Grade 0  [Urinary Retention: Grade 1 - Urinary, suprapubic or intermittent catheter placement not indicated; able to void with some residual] : Urinary Retention: Grade 1 - Urinary, suprapubic or intermittent catheter placement not indicated; able to void with some residual [Urinary Tract Pain: Grade 0] : Urinary Tract Pain: Grade 0 [Urinary Urgency: Grade 1 - Present] : Urinary Urgency: Grade 1 - Present [Urinary Frequency: Grade 1 - Present] : Urinary Frequency: Grade 1 - Present [Constipation] : constipation [Nocturia] : nocturia [Dizziness] : dizziness [Negative] : Psychiatric [Anal Pain: Grade 0] : Anal Pain: Grade 0 [Constipation: Grade 1 - Occasional or intermittent symptoms; occasional use of stool softeners, laxatives, dietary modification, or enema] : Constipation: Grade 1 - Occasional or intermittent symptoms; occasional use of stool softeners, laxatives, dietary modification, or enema [Diarrhea: Grade 0] : Diarrhea: Grade 0 [Fecal Incontinence: Grade 0] : Fecal Incontinence: Grade 0 [Nausea: Grade 0] : Nausea: Grade 0 [Vomiting: Grade 0] : Vomiting: Grade 0 [Rectal Pain: Grade 0] : Rectal Pain: Grade 0 [Fever] : no fever [Fatigue] : no fatigue [Loss of Hearing] : no loss of hearing [Abdominal Pain] : no abdominal pain [Vomiting] : no vomiting [Diarrhea] : no diarrhea [Hot Flashes] : no hot flashes [Muscle Weakness] : no muscle weakness [FreeTextEntry7] : blood in BMs 3 weeks ago.  [FreeTextEntry8] : x3 nocturia. freq 2-3 x during the day.  [de-identified] : mild during the day.  [de-identified] : blood in BMs 3 weeks ago [FreeTextEntry2] : on iron po supplement [Dizziness: Grade 1 - Mild unsteadiness or sensation of movement] : Dizziness: Grade 1 - Mild unsteadiness or sensation of movement [Headache: Grade 0] : Headache: Grade 0 [FreeTextEntry3] : transient upon getting up [Cough: Grade 0] : Cough: Grade 0 [Dyspnea: Grade 0] : Dyspnea: Grade 0

## 2025-06-11 NOTE — DISEASE MANAGEMENT
[c] : c [1] : N1 [0] : M0 [10-20] : 10 - 20 ng/mL [Biopsy with Fusion] : Patient had a biopsy with fusion on [7(4+3)] : Template Biopsy [place] Score: 7(4+3) [9] : Fusion Biopsy Kris Score: 9 [] : Patient had a Prostate MRI [4] : 4 [Clinical] : TNM Stage: c [ANA] : ANA [Radiation Therapy] : Radiation Therapy [Treatment with radiation therapy] : Treatment with radiation therapy [EBRT] : EBRT [Treatment with androgen ablation] : Treatment with androgen ablation [BiopsyDate] : 7/25/22 [MeasuredProstateVolume] : 43 [TotalCores] : 13 [TotalPositiveCores] : 3 [MaxCoreInvolvement] : 60% [FreeTextEntry7] : MR prostate 6/17/22 showed a 4.3 x 3.8 x 5.0 cm = 43 cc gland with dominant lesion in left posteromedial mid to apex peripheral zone (1.3 cm, CT-4); no DEVIN/SVI/LAD. \par  \par  Biopsy performed 7/25/22 showed G9(4+5) disease in the target lesion (2/4, %); G7(4+3) disease in the left posterior medial apex (1/1, 25%), G7(3+4) disease in the left posterior lateral apex (1/1, 40%).  \par  \par  PSMA PET 9/16/22 showed avidity in left prostate as well as in a left obturator node c/w metastasis. (node only concerning on PSMA imaging) [TTNM] : 1c [NTNM] : 1 [MTNM] : 0 [LDR] : No LDR [HDR] : No HDR [RadiationCompletedDate] : 2/23/23 [EBRTDose] : 5806 [EBRTFractions] : 28 [ADTStartedDate] : 10/27/22 [ADTDuration] : 2 years

## 2025-06-11 NOTE — PHYSICAL EXAM
[General Appearance - Well Nourished] : well nourished [General Appearance - In No Acute Distress] : in no acute distress [Sclera] : the sclera and conjunctiva were normal [PERRL With Normal Accommodation] : pupils were equal in size, round, reactive to light [Extraocular Movements] : extraocular movements were intact [Hearing Threshold Finger Rub Not Gem] : hearing was normal [Heart Rate And Rhythm] : heart rate and rhythm were normal [Heart Sounds] : normal S1 and S2 [Murmurs] : no murmurs present [Edema] : no peripheral edema present [Normal] : no JVD, no calf tenderness [Bowel Sounds] : normal bowel sounds [Abdomen Soft] : soft [Nondistended] : nondistended [Abdomen Tenderness] : non-tender [Musculoskeletal - Swelling] : no joint swelling [Range of Motion to Joints] : range of motion to joints [Motor Tone] : muscle strength and tone were normal [Skin Color & Pigmentation] : normal skin color and pigmentation [No Focal Deficits] : no focal deficits [Oriented To Time, Place, And Person] : oriented to person, place, and time

## 2025-06-11 NOTE — HISTORY OF PRESENT ILLNESS
[FreeTextEntry1] : 73-year-old male with very high risk prostate cancer, cT1c N0-1 (N1 only on PSMA PET) G9(4+5), with pre-diagnosis PSA 13.2 6/16/22; pre-Tx PSA 14.6 on 10/13/22. Decipher 0.65. Baseline IPSS/EPIC Score 15/29.   He enrolled on the -009 protocol, and was randomized to Arm 1, definitive radiation therapy and 2 years ADT.  ADT was started 10/27/22.  The patient underwent a course of radiation therapy as outlined below.   Radiation Treatment Summary:  Prostate/SV/Nodes- 7,000 cGy in 28 fx from 1/13/2023 - 2/23/2023  Clinical Course: Tolerated the treatment well and developed the expected side effects, including increased urgency/frequency and dysuria; this was treated with tamsulosin and AZO.  4/26/23: PTE Notes improvement in  symptoms and almost back to baseline. Nocturia 4x, good stream, intermittent post void dribbling. Hot flashes bothersome but not tried black cohosh. Stops fluids at 6 PM. Denies dysuria, hematuria or bowel symptoms. Taking tamsulosin 1 tab at night. not taking AZO.  Not sexually active without issue.  IPSS/QOL/EPIC Score 14/5/16. Of note due for ADT injection but awaiting insurance approval.  Followup 7/25/2023 Present in follow up visit. He continues to be followed by Research team and Radiation Oncology; he received Eligard 22.5 mg dose, most recently 4/28/23 with us for 3 months dose.  Now will continue with Eligard every 6 months. Eligard 45 mg given today. He also f/w urology Dr. Diego; c/w oxybutynin and Flomax Still has urgency and frequent urination throughout the day. Up frequently at night to urinate. Taking flomax x1, oxybutynin 5mg x1. Notes that when he is very active he sometimes gets lightheaded but is not hydrating well. No bowel issues. Not too bothered by hot flashes. Feels that he doesn't know what to eat and is not eating enough based on what he reads from google search.  IPSS/QOL/EPIC: Score 19/2/19  Followup 10/5/2023: Patient presents for follow up via telehealth.  Nocturia x2-3, not taking oxybutynin, was taking flomax but just ran out. Overall had felt improvement in symptoms with less urgency on medication. Bowels okay, no diarrhea. Still some dizziness but better. No issues with hot flashes.   Follow-up 1/25/2024  Patient report nocturia 2-3 times per night, and frequency. He denies dysuria, hematuria, hesitancy, and weak flow. Patient endorsed occasional constipation and he is not sexually active. No hot flashes, good energy level.  IPSS/QOL/EPIC: 9/1/3  PSA trend: iPSA 14.6; baseline testosterone 347 -> ADT started 10/27/22(planned x 2 years). Final 3 mo Eligard 7/25/24 12/9/22 PSA 2.35 1/20/23 PSA 1.58 -> RT complete 2/23/2023 4/28/23: PSA 0.12; Testosterone 19.2 7/14/23: PSA 0.04; testosterone 4.3 10/4/23:0.02, testosterone 7.5 1/4/24: 0.25 1/22/24: 0.31, testosterone 440  2/18/24:0.33 7/22/24: <0.01 01/16/2025 :PSA < 0.01. Testosterone: 14.5  Follow-up 4/3/24:  Feels generally well. Notes stable urinary frequency with occasional dribbling. Continues on tamsulosin IPSS/QOL/EPIC: 9/1/17  1/15/2025 -- PSA <0.01  1/17/2025 F/U. Enrolled on the -009 protocol, and was randomized to Arm 1, definitive radiation therapy and 2 years ADT (started 10/27/22 with final 3 mo. Eligard 7/25/24) Feels generally well and offers no acute complaints. Continues on tamsulosin daily.  Nocturia X3.   06/11/25 F/U  .  Pt endorses he is doing well, good energy/good appetite. He is hungry today while  on bowel prep for colonoscopy tomorrow.  He had bloody BMs 3 weeks ago. Nocturia x3 . On tamsulosin.  Pt is interested in medication for his ED.   He f/u with  Dr. Paezun.  Pt c/o dizziness at times "when he does things " On amlodipine .  Advised pt to change positions slowly, maintain adequate hydration, and f/u with his PCP. Pt denies fever, headache, SOB, chest discomfort, N/V/abdominal discomfort, dysuria, hematuria.

## 2025-06-18 NOTE — HISTORY OF PRESENT ILLNESS
[de-identified] : Klever Mitchell is a 72 years old male with locally advanced prostate cancer who present for following up.   He had the initial consultation on 10/17/22:  initially elevated PSA of 5 in Oct 2020 with "indeterminate" biopsy in [facility].   Patient has been enrolled in study NRG--009, Decipher score 0.65, randomized to de-intensification, Arm 1 RT + 24 mos ADT. Patient was re-consented as there had been changes to protocol, none of which affect his current treatment. Some of the changes in amendment 2 include: Relugolix was added as an acceptable ADT. The imaging sub study that was temporarily closed with protocol amendment 1 has been permanently closed, the applicable sections in the protocol and consent documents have been removed. New exploratory end point and corresponding information were added about impact of PET use. Pre treatment assessments table - tissue, blood and urine: prior to RT time point was added because it was inadvertently omitted. - Footnote 1: Malignancy is considered 0 for ACE Scoring was added for clarity.  6/14/22 PSA 13.2ng/ml  6/17/22 seen by Dr. Diego. MRI prostate showed one lesion measuring 13 mm in the left posterior medial mid gland to apex peripheral is peripheral zone which did not have any evidence of extraprostatic extension seminal vesicle invasion or pelvic lymphadenopathy.  7/25/22 He underwent biopsy. 3 out of 13 cores were positive including the targeted cord which had Lisman 4+5 disease in 60% of the core. 2 other cores were 4+3 and 3+4.  8/26/22 whole body bone scan revealed right posterior rib increased uptake concerning for osseous metastasis. Punctate foci in the right anterolateral mid ribs and possibly left mid posterior rib and nonspecific.  9/16/22 PET CT PSMA revealed a small PSMA positive left obturator lymph node which is compatible with metastatic disease. Intense focus of increased radiotracer activity in the left posterior prostate gland consistent with the MRI identified lesion. Likely traumatic rib foci corresponding to the abnormality seen on the bone scan dated 8 /26/22. An indeterminate focus of mild radiotracer activity at the lateral aspect of his right scapula not well delineated on CT. Metastasis unlikely.   10/13/22 PSA 14.6ng/ml  Reports intermittent left big toe pain 5/10 after injury in 2018 and left hip/groin intermittent pain 7/10, relieved with meloxicam and completed 3 weeks ago. Deep venous doppler negative for DVT. He reports difficulty urination, hesitancy, urgency, nocturia x3 and urinary frequency. Denies, dribbling and gross hematuria. He was started on bicalutamide on Oct 13, 2022.   12/9/22: Patient began treatment with eligard on 10/27 with Dr. Araya. He took bicalutamide x 1 month which is now complete. Patient feels well. He denies hot flashes/sweating, mood swings and fatigue. He has nocturia x3, urgency and sometimes feels like he has incomplete emptying; he denies dysuria and hematuria. He exercises most days, normally walks several miles but now that it is getting colder he exercises in the house. He is constipated, has normal bowel movements 3 times a week. Patient denies fever, chills, headache, vision changes, chest pain, palpitations, shortness of breath, abdominal pain, nausea, vomiting, diarrhea, rash, itching, joint pain.   1/120/23: Overall patient is feeling well. He has started RT and is tolerating. He does feel more fatigued than prior, however it is not having a large impact on his life/quality of life. Patient reports in frequent hot flashes. He notes constipation and has a bowel movement about two times per week. He has anxiety related to his diagnosis that he occasionally takes xanax for, usually only once or twice a week. He has urinary frequency, urinates 4-5x during the day and 4-5x during the night, also experiences urgency and incomplete emptying. He reports sometimes when he starts urinating there is a slight burning. He was just prescribed tamsulosin two days ago.     [de-identified] : prostate adenocarcinoma  [FreeTextEntry1] : NRG--009--->will stop trial--->ADT [de-identified] :  4/28/23 pt and his daughter report that pt has intermittent dizziness unrelated to body position, which started after radiation therapy (finished in 2/2023). He had two episodes of vertigo in the last month but denies fall and hearing difficulty/tinnitus. Admits hot flushes 2 times/daily lasting 2-5 mins. Denied vision changes, headache, N/V, significant changes with urination and Bowels.    7/14/23 He admits intermittent dizziness at [facility] likely due to hot weather. Last dizziness and hot flushes happened around 2 wks ago at [facility] ([place] back to [place] on 7/12/23) Complains sleep 5-6hrs at night and sleep has been interrupted by nocturnal urination 3-4/night;  denied urine frequency, difficulty and dripping. Seen by urology Dr. Diego and prescribed oxybutynin 5mg qhs per note.   10/6/23  He came back from [facility] about 3 days ago, denied n/v, urine frequency, difficulty and dripping. He endorsed intermittent dizziness likely due to hot weather at [facility], nocturnal urination 2-3/night. Reports intermittent hot flushes. occasional finger spasms started about 2 months ago but denied numbness and tingling sensation at finger tips. he had two episodes of thrusting pain at scalp at night in the last week, which resolved spontaneous; denied focal weakness, hearing/vision changes, altered mental status.   1/4/24 Hgb 12.2, PSA 0.25ng/ml, LFT wnl, creatinine 0.98 1/22/24 His dizzinees was resolved. Feels overall fine. Reports normal urination, denies frequency and gross hematuria.  Nocturia 2 to 3.   8/9/24 pt is doing well, denies any complaints. Appetite is good, gaining weight.  Denies any diarrhea, constipation or blood in the stool. Now endorses right metacarpal tenderness during flexion [Phalen's test is Negative].  1/15/25: Patient presents for follow up. He remains on the NRG--009 trial but he was in [facility] and non-compliant with office visits. Last visit was August, 2024 and the protocol requires office visits every 3 months. He experiences dizziness every so often. He endorses frequent urination every hour but sometimes does not urinate. He drinks 7-8 bottles per day.  At night he urinates 4 times a night. Denies night sweats, hot flashes.   6/18/25 reports one episode of fresh blood in the stool in less than two weeks ago, underwent colonoscopy on June 12, 2025. He was found to have internal hemorrhoids. Nocturia 2. Reports unchanged dizziness. Denies fatigue/hot flash and sweating. Endorses only left hand fingers with tingling.

## 2025-06-18 NOTE — REVIEW OF SYSTEMS
[Fever] : no fever [Fatigue] : no fatigue [Loss of Hearing] : no loss of hearing [Abdominal Pain] : no abdominal pain [Vomiting] : no vomiting [Diarrhea] : no diarrhea [Hot Flashes] : no hot flashes [Muscle Weakness] : no muscle weakness [FreeTextEntry8] : x3 nocturia. freq 2-3 x during the day.  [FreeTextEntry7] : blood in BMs 3 weeks ago.  [de-identified] : mild during the day.  [de-identified] : blood in BMs 3 weeks ago [FreeTextEntry2] : on iron po supplement [FreeTextEntry3] : transient upon getting up

## 2025-06-18 NOTE — ASSESSMENT
[FreeTextEntry1] : 72 years old male with regional node positive locally advanced prostate cancer without evidence of distant metastasis. Patient received bicalutamide for one month and received Eligard 45mg in October 2022. Patient has been enrolled in study NRG--009, Decipher score 0.65, randomized to de-intensification, Arm 1 RT + 24 mos ADT. Now, off the trial due to non-compliance. He is on surveillance every 6 months, since it has been 2 years  since he was on ADT with PSA undetectable.  regional node positive prostate cancer on  009, back on the trial now - s/p RT with Dr. Araya, 28 frax started on 1/13/2023 and completed on 2/23/2023. - continue follow up with Research team and urology; Decipher score 0.65, patient randomized to de-intensification cohort of NRG--009 Arm 1: RT + 24 mos ADT (ADT started on 10/27/22), last ADT 7/26 - Eligard 45mg given w/ Dr. Rodriguez, has been 2 years no longer receives this - f/w urology Dr. Diego; oxybutynin stopped, currently on flomax for urinary dribbling after RT - reviewed labs July 2024: testosterone <2.5, PSA < 0.01 - f/u with PCP for adjusting diet based on HbA1C (HbA1C 6.3 on 1/20/2023). - DEXA scan 10/2022 shows normal bone density; continue calcium and vitamin D - continue with xanax 0.25mg q12h PRN for anxiety - mild anemia/thrombocytopenia: send lab work evaluation - on surveillance now every 6 months with PSA  RTC 6 months in December

## 2025-06-18 NOTE — DISEASE MANAGEMENT
[BiopsyDate] : 7/25/22 [MeasuredProstateVolume] : 43 [TotalCores] : 13 [TotalPositiveCores] : 3 [MaxCoreInvolvement] : 60% [FreeTextEntry7] : MR prostate 6/17/22 showed a 4.3 x 3.8 x 5.0 cm = 43 cc gland with dominant lesion in left posteromedial mid to apex peripheral zone (1.3 cm, AK-4); no DEVIN/SVI/LAD. \par  \par  Biopsy performed 7/25/22 showed G9(4+5) disease in the target lesion (2/4, %); G7(4+3) disease in the left posterior medial apex (1/1, 25%), G7(3+4) disease in the left posterior lateral apex (1/1, 40%).  \par  \par  PSMA PET 9/16/22 showed avidity in left prostate as well as in a left obturator node c/w metastasis. (node only concerning on PSMA imaging) [TTNM] : 1c [NTNM] : 1 [MTNM] : 0 [LDR] : No LDR [HDR] : No HDR [RadiationCompletedDate] : 2/23/23 [EBRTDose] : 6728 [EBRTFractions] : 28 [ADTStartedDate] : 10/27/22 [ADTDuration] : 2 years

## 2025-06-18 NOTE — DISEASE MANAGEMENT
[BiopsyDate] : 7/25/22 [MeasuredProstateVolume] : 43 [TotalCores] : 13 [TotalPositiveCores] : 3 [MaxCoreInvolvement] : 60% [FreeTextEntry7] : MR prostate 6/17/22 showed a 4.3 x 3.8 x 5.0 cm = 43 cc gland with dominant lesion in left posteromedial mid to apex peripheral zone (1.3 cm, VT-4); no DEVIN/SVI/LAD. \par  \par  Biopsy performed 7/25/22 showed G9(4+5) disease in the target lesion (2/4, %); G7(4+3) disease in the left posterior medial apex (1/1, 25%), G7(3+4) disease in the left posterior lateral apex (1/1, 40%).  \par  \par  PSMA PET 9/16/22 showed avidity in left prostate as well as in a left obturator node c/w metastasis. (node only concerning on PSMA imaging) [TTNM] : 1c [NTNM] : 1 [MTNM] : 0 [LDR] : No LDR [HDR] : No HDR [RadiationCompletedDate] : 2/23/23 [EBRTDose] : 8628 [EBRTFractions] : 28 [ADTStartedDate] : 10/27/22 [ADTDuration] : 2 years

## 2025-06-18 NOTE — REVIEW OF SYSTEMS
[Dysuria] : no dysuria [Incontinence] : no incontinence [FreeTextEntry8] : nocturnal urination 2/night which interferes with sleep, urge incontinence

## 2025-06-18 NOTE — REVIEW OF SYSTEMS
[Fever] : no fever [Fatigue] : no fatigue [Loss of Hearing] : no loss of hearing [Abdominal Pain] : no abdominal pain [Vomiting] : no vomiting [Diarrhea] : no diarrhea [Hot Flashes] : no hot flashes [Muscle Weakness] : no muscle weakness [FreeTextEntry8] : x3 nocturia. freq 2-3 x during the day.  [FreeTextEntry7] : blood in BMs 3 weeks ago.  [de-identified] : mild during the day.  [de-identified] : blood in BMs 3 weeks ago [FreeTextEntry2] : on iron po supplement [FreeTextEntry3] : transient upon getting up

## 2025-06-18 NOTE — HISTORY OF PRESENT ILLNESS
[de-identified] : Klever Mitchell is a 72 years old male with locally advanced prostate cancer who present for following up.   He had the initial consultation on 10/17/22:  initially elevated PSA of 5 in Oct 2020 with "indeterminate" biopsy in [facility].   Patient has been enrolled in study NRG--009, Decipher score 0.65, randomized to de-intensification, Arm 1 RT + 24 mos ADT. Patient was re-consented as there had been changes to protocol, none of which affect his current treatment. Some of the changes in amendment 2 include: Relugolix was added as an acceptable ADT. The imaging sub study that was temporarily closed with protocol amendment 1 has been permanently closed, the applicable sections in the protocol and consent documents have been removed. New exploratory end point and corresponding information were added about impact of PET use. Pre treatment assessments table - tissue, blood and urine: prior to RT time point was added because it was inadvertently omitted. - Footnote 1: Malignancy is considered 0 for ACE Scoring was added for clarity.  6/14/22 PSA 13.2ng/ml  6/17/22 seen by Dr. Diego. MRI prostate showed one lesion measuring 13 mm in the left posterior medial mid gland to apex peripheral is peripheral zone which did not have any evidence of extraprostatic extension seminal vesicle invasion or pelvic lymphadenopathy.  7/25/22 He underwent biopsy. 3 out of 13 cores were positive including the targeted cord which had [place] 4+5 disease in 60% of the core. 2 other cores were 4+3 and 3+4.  8/26/22 whole body bone scan revealed right posterior rib increased uptake concerning for osseous metastasis. Punctate foci in the right anterolateral mid ribs and possibly left mid posterior rib and nonspecific.  9/16/22 PET CT PSMA revealed a small PSMA positive left obturator lymph node which is compatible with metastatic disease. Intense focus of increased radiotracer activity in the left posterior prostate gland consistent with the MRI identified lesion. Likely traumatic rib foci corresponding to the abnormality seen on the bone scan dated 8 /26/22. An indeterminate focus of mild radiotracer activity at the lateral aspect of his right scapula not well delineated on CT. Metastasis unlikely.   10/13/22 PSA 14.6ng/ml  Reports intermittent left big toe pain 5/10 after injury in 2018 and left hip/groin intermittent pain 7/10, relieved with meloxicam and completed 3 weeks ago. Deep venous doppler negative for DVT. He reports difficulty urination, hesitancy, urgency, nocturia x3 and urinary frequency. Denies, dribbling and gross hematuria. He was started on bicalutamide on Oct 13, 2022.   12/9/22: Patient began treatment with eligard on 10/27 with Dr. Araya. He took bicalutamide x 1 month which is now complete. Patient feels well. He denies hot flashes/sweating, mood swings and fatigue. He has nocturia x3, urgency and sometimes feels like he has incomplete emptying; he denies dysuria and hematuria. He exercises most days, normally walks several miles but now that it is getting colder he exercises in the house. He is constipated, has normal bowel movements 3 times a week. Patient denies fever, chills, headache, vision changes, chest pain, palpitations, shortness of breath, abdominal pain, nausea, vomiting, diarrhea, rash, itching, joint pain.   1/120/23: Overall patient is feeling well. He has started RT and is tolerating. He does feel more fatigued than prior, however it is not having a large impact on his life/quality of life. Patient reports in frequent hot flashes. He notes constipation and has a bowel movement about two times per week. He has anxiety related to his diagnosis that he occasionally takes xanax for, usually only once or twice a week. He has urinary frequency, urinates 4-5x during the day and 4-5x during the night, also experiences urgency and incomplete emptying. He reports sometimes when he starts urinating there is a slight burning. He was just prescribed tamsulosin two days ago.     [de-identified] : prostate adenocarcinoma  [FreeTextEntry1] : NRG--009--->will stop trial--->ADT [de-identified] :  4/28/23 pt and his daughter report that pt has intermittent dizziness unrelated to body position, which started after radiation therapy (finished in 2/2023). He had two episodes of vertigo in the last month but denies fall and hearing difficulty/tinnitus. Admits hot flushes 2 times/daily lasting 2-5 mins. Denied vision changes, headache, N/V, significant changes with urination and Bowels.    7/14/23 He admits intermittent dizziness at [facility] likely due to hot weather. Last dizziness and hot flushes happened around 2 wks ago at [facility] (returned back to [place] on 7/12/23) Complains sleep 5-6hrs at night and sleep has been interrupted by nocturnal urination 3-4/night;  denied urine frequency, difficulty and dripping. Seen by urology Dr. Diego and prescribed oxybutynin 5mg qhs per note.   10/6/23  He came back from [facility] about 3 days ago, denied n/v, urine frequency, difficulty and dripping. He endorsed intermittent dizziness likely due to hot weather at [facility], nocturnal urination 2-3/night. Reports intermittent hot flushes. occasional finger spasms started about 2 months ago but denied numbness and tingling sensation at finger tips. he had two episodes of thrusting pain at scalp at night in the last week, which resolved spontaneous; denied focal weakness, hearing/vision changes, altered mental status.   1/4/24 Hgb 12.2, PSA 0.25ng/ml, LFT wnl, creatinine 0.98 1/22/24 His dizzinees was resolved. Feels overall fine. Reports normal urination, denies frequency and gross hematuria.  Nocturia 2 to 3.   8/9/24 pt is doing well, denies any complaints. Appetite is good, gaining weight.  Denies any diarrhea, constipation or blood in the stool. Now endorses right metacarpal tenderness during flexion [Phalen's test is Negative].  1/15/25: Patient presents for follow up. He remains on the NRG--009 trial but he was in [facility] and non-compliant with office visits. Last visit was August, 2024 and the protocol requires office visits every 3 months. He experiences dizziness every so often. He endorses frequent urination every hour but sometimes does not urinate. He drinks 7-8 bottles per day.  At night he urinates 4 times a night. Denies night sweats, hot flashes.   6/18/25 reports one episode of fresh blood in the stool in less than two weeks ago, underwent colonoscopy on June 12, 2025. He was found to have internal hemorrhoids. Nocturia 2. Reports unchanged dizziness. Denies fatigue/hot flash and sweating. Endorses only left hand fingers with tingling.

## 2025-06-19 NOTE — HISTORY OF PRESENT ILLNESS
[FreeTextEntry1] : Former patient of Dr Diego H/o prostate cancer s/p radiation therapy which completed 2/23/23. Dose: 7000. EBRT Fractions: 28- and 2-years ADT. Patient has been enrolled in study NRG--009, Decipher score 0.65, randomized to de-intensification, Arm 1 RT + 24 mos ADT. Presents today for follow up visit.  06/18/25 PSA - 0.39 ng/mL 06/18/25 testosterone - 676 ng/mL  Urinary function: Stable. Good flow and nocturia 2-3x which depends on late evening/night fluid consumption. Denies gross hematuria or dysuria.  Poor erections not interested in PDE5 inhibitors.

## 2025-06-19 NOTE — DISEASE MANAGEMENT
[c] : c [1] : N1 [0] : M0 [10-20] : 10 - 20 ng/mL [Biopsy with Fusion] : Patient had a biopsy with fusion on [7(4+3)] : Template Biopsy [place] Score: 7(4+3) [9] : Fusion Biopsy [place] Score: 9 [Biopsy results sent to PCP/Referring Physician] : Biopsy results sent to PCP/Referring Physician [] : Patient had a Prostate MRI [4] : 4 [BiopsyDate] : 7/25/22 [MeasuredProstateVolume] : 43 [TotalCores] : 13 [TotalPositiveCores] : 3 [MaxCoreInvolvement] : 60% [FreeTextEntry7] : MR prostate 6/17/22 showed a 4.3 x 3.8 x 5.0 cm = 43 cc gland with dominant lesion in left posteromedial mid to apex peripheral zone (1.3 cm, MS-4); no DEVIN/SVI/LAD. \par  \par  Biopsy performed 7/25/22 showed G9(4+5) disease in the target lesion (2/4, %); G7(4+3) disease in the left posterior medial apex (1/1, 25%), G7(3+4) disease in the left posterior lateral apex (1/1, 40%).  \par  \par  PSMA PET 9/16/22 showed avidity in left prostate as well as in a left obturator node c/w metastasis. (node only concerning on PSMA imaging) [ANA] : ANA [Radiation Therapy] : Radiation Therapy [Treatment with radiation therapy] : Treatment with radiation therapy [EBRT] : EBRT [LDR] : No LDR [HDR] : No HDR [Treatment with androgen ablation] : Treatment with androgen ablation [RadiationCompletedDate] : 2/23/23 [EBRTDose] : 3997 [EBRTFractions] : 28 [ADTStartedDate] : 10/27/22 [ADTDuration] : 2 years [Clinical] : TNM Stage: c [TTNM] : 1c [NTNM] : 1 [MTNM] : 0